# Patient Record
Sex: FEMALE | Race: ASIAN | NOT HISPANIC OR LATINO
[De-identification: names, ages, dates, MRNs, and addresses within clinical notes are randomized per-mention and may not be internally consistent; named-entity substitution may affect disease eponyms.]

---

## 2019-06-28 ENCOUNTER — RESULT REVIEW (OUTPATIENT)
Age: 34
End: 2019-06-28

## 2020-01-24 ENCOUNTER — APPOINTMENT (OUTPATIENT)
Dept: OBGYN | Facility: CLINIC | Age: 35
End: 2020-01-24

## 2020-03-02 ENCOUNTER — TRANSCRIPTION ENCOUNTER (OUTPATIENT)
Age: 35
End: 2020-03-02

## 2020-03-03 ENCOUNTER — RESULT REVIEW (OUTPATIENT)
Age: 35
End: 2020-03-03

## 2020-03-03 ENCOUNTER — INPATIENT (INPATIENT)
Facility: HOSPITAL | Age: 35
LOS: 0 days | Discharge: HOME | DRG: 743 | End: 2020-03-04
Attending: OBSTETRICS & GYNECOLOGY | Admitting: OBSTETRICS & GYNECOLOGY
Payer: COMMERCIAL

## 2020-03-03 VITALS
OXYGEN SATURATION: 99 % | HEART RATE: 71 BPM | TEMPERATURE: 98 F | SYSTOLIC BLOOD PRESSURE: 93 MMHG | DIASTOLIC BLOOD PRESSURE: 63 MMHG | WEIGHT: 119.49 LBS | RESPIRATION RATE: 16 BRPM | HEIGHT: 63.25 IN

## 2020-03-03 DIAGNOSIS — Z98.890 OTHER SPECIFIED POSTPROCEDURAL STATES: Chronic | ICD-10-CM

## 2020-03-03 LAB
BLD GP AB SCN SERPL QL: NEGATIVE — SIGNIFICANT CHANGE UP
RH IG SCN BLD-IMP: POSITIVE — SIGNIFICANT CHANGE UP

## 2020-03-03 PROCEDURE — 88305 TISSUE EXAM BY PATHOLOGIST: CPT | Mod: 26

## 2020-03-03 RX ORDER — BUPIVACAINE 13.3 MG/ML
20 INJECTION, SUSPENSION, LIPOSOMAL INFILTRATION ONCE
Refills: 0 | Status: DISCONTINUED | OUTPATIENT
Start: 2020-03-03 | End: 2020-03-04

## 2020-03-03 RX ORDER — ACETAMINOPHEN 500 MG
1000 TABLET ORAL EVERY 8 HOURS
Refills: 0 | Status: DISCONTINUED | OUTPATIENT
Start: 2020-03-03 | End: 2020-03-04

## 2020-03-03 RX ORDER — SODIUM CHLORIDE 9 MG/ML
1000 INJECTION, SOLUTION INTRAVENOUS
Refills: 0 | Status: DISCONTINUED | OUTPATIENT
Start: 2020-03-03 | End: 2020-03-04

## 2020-03-03 RX ORDER — KETOROLAC TROMETHAMINE 30 MG/ML
30 SYRINGE (ML) INJECTION EVERY 6 HOURS
Refills: 0 | Status: DISCONTINUED | OUTPATIENT
Start: 2020-03-03 | End: 2020-03-04

## 2020-03-03 RX ORDER — DIPHENHYDRAMINE HCL 50 MG
25 CAPSULE ORAL EVERY 4 HOURS
Refills: 0 | Status: DISCONTINUED | OUTPATIENT
Start: 2020-03-03 | End: 2020-03-04

## 2020-03-03 RX ORDER — OXYCODONE HYDROCHLORIDE 5 MG/1
10 TABLET ORAL EVERY 6 HOURS
Refills: 0 | Status: DISCONTINUED | OUTPATIENT
Start: 2020-03-03 | End: 2020-03-04

## 2020-03-03 RX ORDER — LAMOTRIGINE 25 MG/1
100 TABLET, ORALLY DISINTEGRATING ORAL
Refills: 0 | Status: DISCONTINUED | OUTPATIENT
Start: 2020-03-03 | End: 2020-03-04

## 2020-03-03 RX ORDER — OXYCODONE HYDROCHLORIDE 5 MG/1
5 TABLET ORAL EVERY 4 HOURS
Refills: 0 | Status: DISCONTINUED | OUTPATIENT
Start: 2020-03-03 | End: 2020-03-04

## 2020-03-03 RX ORDER — LAMOTRIGINE 25 MG/1
100 TABLET, ORALLY DISINTEGRATING ORAL
Refills: 0 | Status: DISCONTINUED | OUTPATIENT
Start: 2020-03-03 | End: 2020-03-03

## 2020-03-03 RX ORDER — ONDANSETRON 8 MG/1
4 TABLET, FILM COATED ORAL EVERY 6 HOURS
Refills: 0 | Status: DISCONTINUED | OUTPATIENT
Start: 2020-03-03 | End: 2020-03-04

## 2020-03-03 RX ORDER — METOCLOPRAMIDE HCL 10 MG
10 TABLET ORAL EVERY 6 HOURS
Refills: 0 | Status: DISCONTINUED | OUTPATIENT
Start: 2020-03-03 | End: 2020-03-04

## 2020-03-03 RX ORDER — HYDROMORPHONE HYDROCHLORIDE 2 MG/ML
0.2 INJECTION INTRAMUSCULAR; INTRAVENOUS; SUBCUTANEOUS
Refills: 0 | Status: DISCONTINUED | OUTPATIENT
Start: 2020-03-03 | End: 2020-03-04

## 2020-03-03 RX ADMIN — Medication 30 MILLIGRAM(S): at 23:14

## 2020-03-03 RX ADMIN — ONDANSETRON 4 MILLIGRAM(S): 8 TABLET, FILM COATED ORAL at 19:44

## 2020-03-03 RX ADMIN — HYDROMORPHONE HYDROCHLORIDE 0.2 MILLIGRAM(S): 2 INJECTION INTRAMUSCULAR; INTRAVENOUS; SUBCUTANEOUS at 15:35

## 2020-03-03 RX ADMIN — Medication 10 MILLIGRAM(S): at 23:32

## 2020-03-03 RX ADMIN — Medication 30 MILLIGRAM(S): at 13:57

## 2020-03-03 RX ADMIN — HYDROMORPHONE HYDROCHLORIDE 0.2 MILLIGRAM(S): 2 INJECTION INTRAMUSCULAR; INTRAVENOUS; SUBCUTANEOUS at 15:14

## 2020-03-03 RX ADMIN — Medication 30 MILLIGRAM(S): at 15:02

## 2020-03-03 RX ADMIN — HYDROMORPHONE HYDROCHLORIDE 0.2 MILLIGRAM(S): 2 INJECTION INTRAMUSCULAR; INTRAVENOUS; SUBCUTANEOUS at 15:09

## 2020-03-03 RX ADMIN — HYDROMORPHONE HYDROCHLORIDE 0.2 MILLIGRAM(S): 2 INJECTION INTRAMUSCULAR; INTRAVENOUS; SUBCUTANEOUS at 14:50

## 2020-03-03 NOTE — BRIEF OPERATIVE NOTE - OPERATION/FINDINGS
9cm pedunculated fundal fibroid; 2cm intramural posterior fibroid and 2cm intramural anterior fibroid; <1cm serosal fibroid anterior; normal tubes and ovaries bilaterally

## 2020-03-03 NOTE — H&P ADULT - ASSESSMENT
35yo presenting for scheduled abdominal myomectomy.   -NPO  -IVF  -cross for 2u PRBC  -plan for regional admission

## 2020-03-03 NOTE — H&P ADULT - NSHPPHYSICALEXAM_GEN_ALL_CORE
Vital Signs Last 24 Hrs  T(C): 36.6 (03 Mar 2020 07:52), Max: 36.6 (03 Mar 2020 07:52)  T(F): 97.8 (03 Mar 2020 07:52), Max: 97.8 (03 Mar 2020 07:52)  HR: 71 (03 Mar 2020 07:52) (71 - 71)  BP: 93/63 (03 Mar 2020 07:52) (93/63 - 93/63)  BP(mean): --  RR: 16 (03 Mar 2020 07:52) (16 - 16)  SpO2: 99% (03 Mar 2020 07:52) (99% - 99%)

## 2020-03-03 NOTE — H&P ADULT - HISTORY OF PRESENT ILLNESS
35yo presenting for scheduled abdominal myomectomy. Pt reports having a posterior fibroid that causes her persistent stabbing pain, desires future fertility and surgical management of fibroids. Reports having normal menses and cycle length. Feels well today.     OBHx: denies  GYNHx: denies  PMH: hx of 1x seizure 3 years ago - follows with neurologist, on lamotrigine 100mg BID  PSH: denies  Meds: lamotrigine 100mg BID; previously on escitalopram but discontinued prior to surgery  All: NKDA

## 2020-03-04 ENCOUNTER — TRANSCRIPTION ENCOUNTER (OUTPATIENT)
Age: 35
End: 2020-03-04

## 2020-03-04 VITALS
TEMPERATURE: 98 F | OXYGEN SATURATION: 96 % | SYSTOLIC BLOOD PRESSURE: 103 MMHG | DIASTOLIC BLOOD PRESSURE: 66 MMHG | HEART RATE: 87 BPM | RESPIRATION RATE: 16 BRPM

## 2020-03-04 LAB
ANION GAP SERPL CALC-SCNC: 10 MMOL/L — SIGNIFICANT CHANGE UP (ref 5–17)
BASOPHILS # BLD AUTO: 0.04 K/UL — SIGNIFICANT CHANGE UP (ref 0–0.2)
BASOPHILS NFR BLD AUTO: 0.3 % — SIGNIFICANT CHANGE UP (ref 0–2)
BUN SERPL-MCNC: 8 MG/DL — SIGNIFICANT CHANGE UP (ref 7–23)
CALCIUM SERPL-MCNC: 9.2 MG/DL — SIGNIFICANT CHANGE UP (ref 8.4–10.5)
CHLORIDE SERPL-SCNC: 105 MMOL/L — SIGNIFICANT CHANGE UP (ref 96–108)
CO2 SERPL-SCNC: 26 MMOL/L — SIGNIFICANT CHANGE UP (ref 22–31)
CREAT SERPL-MCNC: 0.51 MG/DL — SIGNIFICANT CHANGE UP (ref 0.5–1.3)
EOSINOPHIL # BLD AUTO: 0.05 K/UL — SIGNIFICANT CHANGE UP (ref 0–0.5)
EOSINOPHIL NFR BLD AUTO: 0.4 % — SIGNIFICANT CHANGE UP (ref 0–6)
GLUCOSE SERPL-MCNC: 108 MG/DL — HIGH (ref 70–99)
HCT VFR BLD CALC: 36.9 % — SIGNIFICANT CHANGE UP (ref 34.5–45)
HGB BLD-MCNC: 12.1 G/DL — SIGNIFICANT CHANGE UP (ref 11.5–15.5)
IMM GRANULOCYTES NFR BLD AUTO: 0.6 % — SIGNIFICANT CHANGE UP (ref 0–1.5)
LYMPHOCYTES # BLD AUTO: 15.9 % — SIGNIFICANT CHANGE UP (ref 13–44)
LYMPHOCYTES # BLD AUTO: 2.18 K/UL — SIGNIFICANT CHANGE UP (ref 1–3.3)
MCHC RBC-ENTMCNC: 31.3 PG — SIGNIFICANT CHANGE UP (ref 27–34)
MCHC RBC-ENTMCNC: 32.8 GM/DL — SIGNIFICANT CHANGE UP (ref 32–36)
MCV RBC AUTO: 95.3 FL — SIGNIFICANT CHANGE UP (ref 80–100)
MONOCYTES # BLD AUTO: 1.02 K/UL — HIGH (ref 0–0.9)
MONOCYTES NFR BLD AUTO: 7.4 % — SIGNIFICANT CHANGE UP (ref 2–14)
NEUTROPHILS # BLD AUTO: 10.34 K/UL — HIGH (ref 1.8–7.4)
NEUTROPHILS NFR BLD AUTO: 75.4 % — SIGNIFICANT CHANGE UP (ref 43–77)
NRBC # BLD: 0 /100 WBCS — SIGNIFICANT CHANGE UP (ref 0–0)
PLATELET # BLD AUTO: 353 K/UL — SIGNIFICANT CHANGE UP (ref 150–400)
POTASSIUM SERPL-MCNC: 4.3 MMOL/L — SIGNIFICANT CHANGE UP (ref 3.5–5.3)
POTASSIUM SERPL-SCNC: 4.3 MMOL/L — SIGNIFICANT CHANGE UP (ref 3.5–5.3)
RBC # BLD: 3.87 M/UL — SIGNIFICANT CHANGE UP (ref 3.8–5.2)
RBC # FLD: 12.2 % — SIGNIFICANT CHANGE UP (ref 10.3–14.5)
SODIUM SERPL-SCNC: 141 MMOL/L — SIGNIFICANT CHANGE UP (ref 135–145)
WBC # BLD: 13.71 K/UL — HIGH (ref 3.8–10.5)
WBC # FLD AUTO: 13.71 K/UL — HIGH (ref 3.8–10.5)

## 2020-03-04 RX ORDER — FOLIC ACID 0.8 MG
1 TABLET ORAL
Qty: 0 | Refills: 0 | DISCHARGE

## 2020-03-04 RX ORDER — ESCITALOPRAM OXALATE 10 MG/1
1 TABLET, FILM COATED ORAL
Qty: 0 | Refills: 0 | DISCHARGE

## 2020-03-04 RX ORDER — LAMOTRIGINE 25 MG/1
1 TABLET, ORALLY DISINTEGRATING ORAL
Qty: 0 | Refills: 0 | DISCHARGE

## 2020-03-04 RX ORDER — LAMOTRIGINE 25 MG/1
100 TABLET, ORALLY DISINTEGRATING ORAL
Refills: 0 | Status: DISCONTINUED | OUTPATIENT
Start: 2020-03-04 | End: 2020-03-04

## 2020-03-04 RX ADMIN — Medication 30 MILLIGRAM(S): at 06:44

## 2020-03-04 RX ADMIN — Medication 30 MILLIGRAM(S): at 05:19

## 2020-03-04 RX ADMIN — Medication 30 MILLIGRAM(S): at 00:11

## 2020-03-04 RX ADMIN — LAMOTRIGINE 100 MILLIGRAM(S): 25 TABLET, ORALLY DISINTEGRATING ORAL at 10:08

## 2020-03-04 NOTE — PROGRESS NOTE ADULT - SUBJECTIVE AND OBJECTIVE BOX
GYN Progress Note    Patient seen at bedside this afternoon. Feeling well with no complaints.   Pain is well controlled on  Tolerating   , passing Tylenol and toradol.    Ambulating without difficulty.   Urinating without discomfort.   Denies CP, palpitations, SOB, fever, chills, nausea, vomiting.    Vital Signs Last 24 Hrs  T(C): 36.7 (04 Mar 2020 08:05), Max: 36.8 (03 Mar 2020 16:46)  T(F): 98 (04 Mar 2020 08:05), Max: 98.3 (03 Mar 2020 16:46)  HR: 80 (04 Mar 2020 08:05) (70 - 84)  BP: 99/61 (04 Mar 2020 08:05) (90/56 - 115/62)  BP(mean): 79 (03 Mar 2020 15:20) (70 - 81)  RR: 15 (04 Mar 2020 08:05) (14 - 48)  SpO2: 99% (04 Mar 2020 08:05) (97% - 100%)    Physical Exam:  Gen: No Acute Distress, resting comfortable in bed  Pulm: Normal work of breathing, no wheezes/rhonchi/rales   GI: soft, appropriately tender, nondistended, +BS, no rebound, no guarding, Incision site clean/dry/intact   Ext: SCDs in place, no edema/erythema/tenderness     I&O's Summary    03 Mar 2020 07:01  -  04 Mar 2020 07:00  --------------------------------------------------------  IN: 285 mL / OUT: 2175 mL / NET: -1890 mL      MEDICATIONS  (STANDING):  acetaminophen   Tablet .. 1000 milliGRAM(s) Oral every 8 hours  BUpivacaine liposome 1.3% Injectable (no eMAR) 20 milliLiter(s) Local Injection once  ketorolac   Injectable 30 milliGRAM(s) IV Push every 6 hours  lamoTRIgine 100 milliGRAM(s) Oral two times a day    MEDICATIONS  (PRN):  diphenhydrAMINE 25 milliGRAM(s) Oral every 4 hours PRN Rash and/or Itching  HYDROmorphone  Injectable 0.2 milliGRAM(s) IV Push every 10 minutes PRN Severe Pain (7 - 10)  metoclopramide Injectable 10 milliGRAM(s) IV Push every 6 hours PRN Nausea and/or Vomiting - 2ND LINE  ondansetron Injectable 4 milliGRAM(s) IV Push every 6 hours PRN Nausea and/or Vomiting - 1ST LINE  oxyCODONE    IR 5 milliGRAM(s) Oral every 4 hours PRN Moderate Pain (4 - 6)  oxyCODONE    IR 10 milliGRAM(s) Oral every 6 hours PRN Severe Pain (7 - 10)      LABS:                        12.1   13.71 )-----------( 353      ( 04 Mar 2020 07:08 )             36.9     03-04    141  |  105  |  8   ----------------------------<  108<H>  4.3   |  26  |  0.51    Ca    9.2      04 Mar 2020 07:08

## 2020-03-04 NOTE — PROGRESS NOTE ADULT - SUBJECTIVE AND OBJECTIVE BOX
GYN Progress Note    Patient seen at bedside.  Pain controlled on current regimen.   Tolerating regular diet. Passing flatus.   Has not been OOB. Newell in place.    Denies CP, palpitations, SOB, fever, chills, nausea, vomiting.    Vital Signs Last 24 Hrs  T(C): 36.7 (04 Mar 2020 05:17), Max: 36.8 (03 Mar 2020 16:46)  T(F): 98.1 (04 Mar 2020 05:17), Max: 98.3 (03 Mar 2020 16:46)  HR: 81 (04 Mar 2020 05:17) (70 - 84)  BP: 97/62 (04 Mar 2020 05:17) (90/56 - 115/62)  BP(mean): 79 (03 Mar 2020 15:20) (69 - 81)  RR: 16 (04 Mar 2020 05:17) (14 - 48)  SpO2: 99% (04 Mar 2020 05:17) (97% - 100%)    Physical Exam:  Gen: No Acute Distress  Pulm: Normal work of breathing  GI: soft, appropriately tender, nondistended, +BS, no rebound, no guarding.  Incision C/D/I  Ext: SCDs in place, wnl    I&O's Summary    03 Mar 2020 07:01  -  04 Mar 2020 06:49  --------------------------------------------------------  IN: 285 mL / OUT: 2175 mL / NET: -1890 mL      MEDICATIONS  (STANDING):  acetaminophen   Tablet .. 1000 milliGRAM(s) Oral every 8 hours  BUpivacaine liposome 1.3% Injectable (no eMAR) 20 milliLiter(s) Local Injection once  ketorolac   Injectable 30 milliGRAM(s) IV Push every 6 hours  lamoTRIgine 100 milliGRAM(s) Oral two times a day    MEDICATIONS  (PRN):  diphenhydrAMINE 25 milliGRAM(s) Oral every 4 hours PRN Rash and/or Itching  HYDROmorphone  Injectable 0.2 milliGRAM(s) IV Push every 10 minutes PRN Severe Pain (7 - 10)  metoclopramide Injectable 10 milliGRAM(s) IV Push every 6 hours PRN Nausea and/or Vomiting - 2ND LINE  ondansetron Injectable 4 milliGRAM(s) IV Push every 6 hours PRN Nausea and/or Vomiting - 1ST LINE  oxyCODONE    IR 5 milliGRAM(s) Oral every 4 hours PRN Moderate Pain (4 - 6)  oxyCODONE    IR 10 milliGRAM(s) Oral every 6 hours PRN Severe Pain (7 - 10)      LABS:

## 2020-03-04 NOTE — DISCHARGE NOTE PROVIDER - HOSPITAL COURSE
Scheduled abdominal myomectomy for fibroid uterus. Tolerated procedure well. Meet all postoperative milestones. Discharged on POD#1.

## 2020-03-04 NOTE — PROGRESS NOTE ADULT - ASSESSMENT
34y Female POD#1 s/p abdominal myomectomy                                        1. Neuro/Pain:  OPM  2  CV:   VS per routine  3. Pulm: Encourage ISS  4. GI: Reg  5. :  TOV this AM  6. Heme: AM CBC  7. ID: --  8. DVT ppx: SCDs  9. Dispo: Likely POD#1 or 2

## 2020-03-04 NOTE — DISCHARGE NOTE PROVIDER - CARE PROVIDER_API CALL
Liana Morillo)  Obstetrics and Gynecology  [address] [place] [address], [place] [address]  Phone: [phone]  Fax: [phone]  Follow Up Time:

## 2020-03-04 NOTE — DISCHARGE NOTE PROVIDER - NSDCMRMEDTOKEN_GEN_ALL_CORE_FT
escitalopram 5 mg oral tablet: 1 tab(s) orally once a day  folic acid 1 mg oral tablet: 1 tab(s) orally once a day  lamoTRIgine 100 mg oral tablet: 1 tab(s) orally 2 times a day

## 2020-03-04 NOTE — PROGRESS NOTE ADULT - ASSESSMENT
34y Female POD#1 s/p abdominal myomectomy                                        1. Neuro/Pain:  tylenol atc, toradol ATC  2  CV:   VS per routine  3. Pulm: Encourage ISS  4. GI: Regular diet, passing flatus  5. :  urinating without discomfort  6. Heme: AM CBC Hgb 9.8  7. ID: --  8. DVT ppx: SCDs, ambulation  9. Dispo: Likely POD#1

## 2020-03-10 DIAGNOSIS — D25.2 SUBSEROSAL LEIOMYOMA OF UTERUS: ICD-10-CM

## 2020-03-10 DIAGNOSIS — D25.1 INTRAMURAL LEIOMYOMA OF UTERUS: ICD-10-CM

## 2020-03-10 DIAGNOSIS — D25.9 LEIOMYOMA OF UTERUS, UNSPECIFIED: ICD-10-CM

## 2020-03-10 PROCEDURE — 80048 BASIC METABOLIC PNL TOTAL CA: CPT

## 2020-03-10 PROCEDURE — 88305 TISSUE EXAM BY PATHOLOGIST: CPT

## 2020-03-10 PROCEDURE — 85025 COMPLETE CBC W/AUTO DIFF WBC: CPT

## 2020-03-10 PROCEDURE — 86901 BLOOD TYPING SEROLOGIC RH(D): CPT

## 2020-03-10 PROCEDURE — 36415 COLL VENOUS BLD VENIPUNCTURE: CPT

## 2020-03-10 PROCEDURE — 86850 RBC ANTIBODY SCREEN: CPT

## 2020-03-11 LAB — SURGICAL PATHOLOGY STUDY: SIGNIFICANT CHANGE UP

## 2020-11-02 ENCOUNTER — RESULT REVIEW (OUTPATIENT)
Age: 35
End: 2020-11-02

## 2021-11-11 ENCOUNTER — RESULT REVIEW (OUTPATIENT)
Age: 36
End: 2021-11-11

## 2022-10-24 NOTE — DISCHARGE NOTE PROVIDER - NSDCDCMDCOMP_GEN_ALL_CORE
I have reviewed discharge instructions with the patient and parent. The patient and parent verbalized understanding. This document is complete and the patient is ready for discharge.

## 2022-11-17 ENCOUNTER — RESULT REVIEW (OUTPATIENT)
Age: 37
End: 2022-11-17
